# Patient Record
Sex: FEMALE | Race: WHITE | NOT HISPANIC OR LATINO | URBAN - METROPOLITAN AREA
[De-identification: names, ages, dates, MRNs, and addresses within clinical notes are randomized per-mention and may not be internally consistent; named-entity substitution may affect disease eponyms.]

---

## 2024-07-22 ENCOUNTER — EMERGENCY (EMERGENCY)
Facility: HOSPITAL | Age: 34
LOS: 1 days | Discharge: HOME | End: 2024-07-22
Attending: EMERGENCY MEDICINE
Payer: COMMERCIAL

## 2024-07-22 VITALS
HEART RATE: 81 BPM | OXYGEN SATURATION: 97 % | TEMPERATURE: 99 F | RESPIRATION RATE: 115 BRPM | SYSTOLIC BLOOD PRESSURE: 114 MMHG | WEIGHT: 145.06 LBS | DIASTOLIC BLOOD PRESSURE: 78 MMHG | HEIGHT: 62 IN

## 2024-07-22 VITALS
RESPIRATION RATE: 17 BRPM | HEART RATE: 87 BPM | DIASTOLIC BLOOD PRESSURE: 67 MMHG | SYSTOLIC BLOOD PRESSURE: 115 MMHG | OXYGEN SATURATION: 100 % | TEMPERATURE: 98 F

## 2024-07-22 LAB
ALBUMIN SERPL ELPH-MCNC: 4 G/DL — SIGNIFICANT CHANGE UP (ref 3.3–5)
ALP SERPL-CCNC: 64 U/L — SIGNIFICANT CHANGE UP (ref 40–120)
ALT FLD-CCNC: 22 U/L — SIGNIFICANT CHANGE UP (ref 10–45)
ANION GAP SERPL CALC-SCNC: 16 MMOL/L — SIGNIFICANT CHANGE UP (ref 5–17)
APPEARANCE UR: CLEAR — SIGNIFICANT CHANGE UP
APTT BLD: 25 SEC — SIGNIFICANT CHANGE UP (ref 24.5–35.6)
AST SERPL-CCNC: 19 U/L — SIGNIFICANT CHANGE UP (ref 10–40)
BACTERIA # UR AUTO: NEGATIVE /HPF — SIGNIFICANT CHANGE UP
BASE EXCESS BLDV CALC-SCNC: -1.1 MMOL/L — SIGNIFICANT CHANGE UP (ref -2–3)
BASOPHILS # BLD AUTO: 0.01 K/UL — SIGNIFICANT CHANGE UP (ref 0–0.2)
BASOPHILS NFR BLD AUTO: 0.1 % — SIGNIFICANT CHANGE UP (ref 0–2)
BILIRUB SERPL-MCNC: 0.2 MG/DL — SIGNIFICANT CHANGE UP (ref 0.2–1.2)
BILIRUB UR-MCNC: NEGATIVE — SIGNIFICANT CHANGE UP
BLD GP AB SCN SERPL QL: NEGATIVE — SIGNIFICANT CHANGE UP
BUN SERPL-MCNC: 9 MG/DL — SIGNIFICANT CHANGE UP (ref 7–23)
CA-I SERPL-SCNC: 1.26 MMOL/L — SIGNIFICANT CHANGE UP (ref 1.15–1.33)
CALCIUM SERPL-MCNC: 9.4 MG/DL — SIGNIFICANT CHANGE UP (ref 8.4–10.5)
CAST: 0 /LPF — SIGNIFICANT CHANGE UP (ref 0–4)
CHLORIDE BLDV-SCNC: 103 MMOL/L — SIGNIFICANT CHANGE UP (ref 96–108)
CHLORIDE SERPL-SCNC: 102 MMOL/L — SIGNIFICANT CHANGE UP (ref 96–108)
CO2 BLDV-SCNC: 26 MMOL/L — SIGNIFICANT CHANGE UP (ref 22–26)
CO2 SERPL-SCNC: 19 MMOL/L — LOW (ref 22–31)
COLOR SPEC: YELLOW — SIGNIFICANT CHANGE UP
CREAT SERPL-MCNC: 0.72 MG/DL — SIGNIFICANT CHANGE UP (ref 0.5–1.3)
DIFF PNL FLD: NEGATIVE — SIGNIFICANT CHANGE UP
EGFR: 114 ML/MIN/1.73M2 — SIGNIFICANT CHANGE UP
EOSINOPHIL # BLD AUTO: 0 K/UL — SIGNIFICANT CHANGE UP (ref 0–0.5)
EOSINOPHIL NFR BLD AUTO: 0 % — SIGNIFICANT CHANGE UP (ref 0–6)
GAS PNL BLDV: 133 MMOL/L — LOW (ref 136–145)
GAS PNL BLDV: SIGNIFICANT CHANGE UP
GAS PNL BLDV: SIGNIFICANT CHANGE UP
GLUCOSE BLDV-MCNC: 148 MG/DL — HIGH (ref 70–99)
GLUCOSE SERPL-MCNC: 143 MG/DL — HIGH (ref 70–99)
GLUCOSE UR QL: NEGATIVE MG/DL — SIGNIFICANT CHANGE UP
HCG SERPL-ACNC: <2 MIU/ML — SIGNIFICANT CHANGE UP
HCO3 BLDV-SCNC: 24 MMOL/L — SIGNIFICANT CHANGE UP (ref 22–29)
HCT VFR BLD CALC: 45.1 % — HIGH (ref 34.5–45)
HCT VFR BLDA CALC: 48 % — HIGH (ref 34.5–46.5)
HGB BLD CALC-MCNC: 15.9 G/DL — SIGNIFICANT CHANGE UP (ref 11.7–16.1)
HGB BLD-MCNC: 15.4 G/DL — SIGNIFICANT CHANGE UP (ref 11.5–15.5)
IMM GRANULOCYTES NFR BLD AUTO: 0.4 % — SIGNIFICANT CHANGE UP (ref 0–0.9)
INR BLD: 1.01 RATIO — SIGNIFICANT CHANGE UP (ref 0.85–1.18)
KETONES UR-MCNC: NEGATIVE MG/DL — SIGNIFICANT CHANGE UP
LACTATE BLDV-MCNC: 2.2 MMOL/L — HIGH (ref 0.5–2)
LEUKOCYTE ESTERASE UR-ACNC: NEGATIVE — SIGNIFICANT CHANGE UP
LIDOCAIN IGE QN: 14 U/L — SIGNIFICANT CHANGE UP (ref 7–60)
LYMPHOCYTES # BLD AUTO: 1.3 K/UL — SIGNIFICANT CHANGE UP (ref 1–3.3)
LYMPHOCYTES # BLD AUTO: 9.4 % — LOW (ref 13–44)
MCHC RBC-ENTMCNC: 29.5 PG — SIGNIFICANT CHANGE UP (ref 27–34)
MCHC RBC-ENTMCNC: 34.1 GM/DL — SIGNIFICANT CHANGE UP (ref 32–36)
MCV RBC AUTO: 86.4 FL — SIGNIFICANT CHANGE UP (ref 80–100)
MONOCYTES # BLD AUTO: 0.49 K/UL — SIGNIFICANT CHANGE UP (ref 0–0.9)
MONOCYTES NFR BLD AUTO: 3.5 % — SIGNIFICANT CHANGE UP (ref 2–14)
NEUTROPHILS # BLD AUTO: 12.03 K/UL — HIGH (ref 1.8–7.4)
NEUTROPHILS NFR BLD AUTO: 86.6 % — HIGH (ref 43–77)
NITRITE UR-MCNC: NEGATIVE — SIGNIFICANT CHANGE UP
NRBC # BLD: 0 /100 WBCS — SIGNIFICANT CHANGE UP (ref 0–0)
PCO2 BLDV: 42 MMHG — SIGNIFICANT CHANGE UP (ref 39–42)
PH BLDV: 7.37 — SIGNIFICANT CHANGE UP (ref 7.32–7.43)
PH UR: 6.5 — SIGNIFICANT CHANGE UP (ref 5–8)
PLATELET # BLD AUTO: 268 K/UL — SIGNIFICANT CHANGE UP (ref 150–400)
PO2 BLDV: 37 MMHG — SIGNIFICANT CHANGE UP (ref 25–45)
POTASSIUM BLDV-SCNC: 4.2 MMOL/L — SIGNIFICANT CHANGE UP (ref 3.5–5.1)
POTASSIUM SERPL-MCNC: 3.9 MMOL/L — SIGNIFICANT CHANGE UP (ref 3.5–5.3)
POTASSIUM SERPL-SCNC: 3.9 MMOL/L — SIGNIFICANT CHANGE UP (ref 3.5–5.3)
PROT SERPL-MCNC: 7.4 G/DL — SIGNIFICANT CHANGE UP (ref 6–8.3)
PROT UR-MCNC: NEGATIVE MG/DL — SIGNIFICANT CHANGE UP
PROTHROM AB SERPL-ACNC: 11.1 SEC — SIGNIFICANT CHANGE UP (ref 9.5–13)
RBC # BLD: 5.22 M/UL — HIGH (ref 3.8–5.2)
RBC # FLD: 11.9 % — SIGNIFICANT CHANGE UP (ref 10.3–14.5)
RBC CASTS # UR COMP ASSIST: 0 /HPF — SIGNIFICANT CHANGE UP (ref 0–4)
RH IG SCN BLD-IMP: POSITIVE — SIGNIFICANT CHANGE UP
SAO2 % BLDV: 65.2 % — LOW (ref 67–88)
SODIUM SERPL-SCNC: 137 MMOL/L — SIGNIFICANT CHANGE UP (ref 135–145)
SP GR SPEC: 1.01 — SIGNIFICANT CHANGE UP (ref 1–1.03)
SQUAMOUS # UR AUTO: 0 /HPF — SIGNIFICANT CHANGE UP (ref 0–5)
UROBILINOGEN FLD QL: 0.2 MG/DL — SIGNIFICANT CHANGE UP (ref 0.2–1)
WBC # BLD: 13.89 K/UL — HIGH (ref 3.8–10.5)
WBC # FLD AUTO: 13.89 K/UL — HIGH (ref 3.8–10.5)
WBC UR QL: 0 /HPF — SIGNIFICANT CHANGE UP (ref 0–5)

## 2024-07-22 PROCEDURE — 99284 EMERGENCY DEPT VISIT MOD MDM: CPT | Mod: 25

## 2024-07-22 PROCEDURE — 84295 ASSAY OF SERUM SODIUM: CPT

## 2024-07-22 PROCEDURE — 82330 ASSAY OF CALCIUM: CPT

## 2024-07-22 PROCEDURE — 99285 EMERGENCY DEPT VISIT HI MDM: CPT

## 2024-07-22 PROCEDURE — 86850 RBC ANTIBODY SCREEN: CPT

## 2024-07-22 PROCEDURE — 83690 ASSAY OF LIPASE: CPT

## 2024-07-22 PROCEDURE — 86900 BLOOD TYPING SEROLOGIC ABO: CPT

## 2024-07-22 PROCEDURE — 84702 CHORIONIC GONADOTROPIN TEST: CPT

## 2024-07-22 PROCEDURE — 82947 ASSAY GLUCOSE BLOOD QUANT: CPT

## 2024-07-22 PROCEDURE — 74177 CT ABD & PELVIS W/CONTRAST: CPT | Mod: MC

## 2024-07-22 PROCEDURE — 85018 HEMOGLOBIN: CPT

## 2024-07-22 PROCEDURE — 87086 URINE CULTURE/COLONY COUNT: CPT

## 2024-07-22 PROCEDURE — 74177 CT ABD & PELVIS W/CONTRAST: CPT | Mod: 26,MC

## 2024-07-22 PROCEDURE — 84132 ASSAY OF SERUM POTASSIUM: CPT

## 2024-07-22 PROCEDURE — 85025 COMPLETE CBC W/AUTO DIFF WBC: CPT

## 2024-07-22 PROCEDURE — 80053 COMPREHEN METABOLIC PANEL: CPT

## 2024-07-22 PROCEDURE — 86901 BLOOD TYPING SEROLOGIC RH(D): CPT

## 2024-07-22 PROCEDURE — 82803 BLOOD GASES ANY COMBINATION: CPT

## 2024-07-22 PROCEDURE — 85610 PROTHROMBIN TIME: CPT

## 2024-07-22 PROCEDURE — 96375 TX/PRO/DX INJ NEW DRUG ADDON: CPT

## 2024-07-22 PROCEDURE — 96374 THER/PROPH/DIAG INJ IV PUSH: CPT | Mod: XU

## 2024-07-22 PROCEDURE — 82435 ASSAY OF BLOOD CHLORIDE: CPT

## 2024-07-22 PROCEDURE — 83605 ASSAY OF LACTIC ACID: CPT

## 2024-07-22 PROCEDURE — 81001 URINALYSIS AUTO W/SCOPE: CPT

## 2024-07-22 PROCEDURE — 85730 THROMBOPLASTIN TIME PARTIAL: CPT

## 2024-07-22 PROCEDURE — 85014 HEMATOCRIT: CPT

## 2024-07-22 RX ORDER — SODIUM CHLORIDE 0.9 % (FLUSH) 0.9 %
2000 SYRINGE (ML) INJECTION ONCE
Refills: 0 | Status: COMPLETED | OUTPATIENT
Start: 2024-07-22 | End: 2024-07-22

## 2024-07-22 RX ORDER — FAMOTIDINE 40 MG
20 TABLET ORAL ONCE
Refills: 0 | Status: COMPLETED | OUTPATIENT
Start: 2024-07-22 | End: 2024-07-22

## 2024-07-22 RX ORDER — ACETAMINOPHEN 325 MG
1000 TABLET ORAL ONCE
Refills: 0 | Status: COMPLETED | OUTPATIENT
Start: 2024-07-22 | End: 2024-07-22

## 2024-07-22 RX ORDER — ONDANSETRON HYDROCHLORIDE 2 MG/ML
4 INJECTION INTRAMUSCULAR; INTRAVENOUS ONCE
Refills: 0 | Status: COMPLETED | OUTPATIENT
Start: 2024-07-22 | End: 2024-07-22

## 2024-07-22 RX ADMIN — Medication 20 MILLIGRAM(S): at 08:23

## 2024-07-22 RX ADMIN — ONDANSETRON HYDROCHLORIDE 4 MILLIGRAM(S): 2 INJECTION INTRAMUSCULAR; INTRAVENOUS at 08:23

## 2024-07-22 RX ADMIN — Medication 2000 MILLILITER(S): at 08:21

## 2024-07-22 RX ADMIN — Medication 400 MILLIGRAM(S): at 08:23

## 2024-07-22 NOTE — ED PROVIDER NOTE - CLINICAL SUMMARY MEDICAL DECISION MAKING FREE TEXT BOX
Diego 34-year-old female no past medical on OCP LMP every 3 months presents with acute onset nausea vomiting nonbilious and profuse diarrhea since 9 PM last night diarrhea is bloody no previous history of GI bleed no history of hemorrhoids patient endorses severe lower abdominal pain crampy in quality diagnosed with COVID on Tuesday with URI symptoms and fever which have resolved on exam respiratory rate normal sats 100% on room air lungs clear positive tenderness right and left lower quadrant no rebound no guarding no previous abdominal surgeries no CVA tenderness no hematuria or dysuria no previous history of ovarian cyst will reevaluate with chaperone for pelvic exam rectal exam high suspicion for enterocolitis secondary to viral etiology from COVID however with michael rectal bleeding will consider evaluation for ischemic colitis

## 2024-07-22 NOTE — ED ADULT NURSE NOTE - RESPIRATORY RATE (BREATHS/MIN)
Mother picked up: prescription.   Identity was verified: Yes.     Mom wants to get script at [place]   [phone]

## 2024-07-22 NOTE — ED PROVIDER NOTE - NSFOLLOWUPINSTRUCTIONS_ED_ALL_ED_FT
You are seen in the ED for diarrhea and abdominal pain.  Your evaluated lower been a CAT scan, the results are attached as documentation below.  No further acute intervention currently needed.  Please return to the ED for worsening symptoms, this includes worsening diarrhea, blood in your diarrhea, fevers, chills.  You should follow-up with a gastroenterologist as discussed, normally attached below, you will also receive a call from a discharge center to help set up an appointment.    Viral Gastroenteritis, Adult  Body outline showing the digestive tract, including the stomach, small intestine, and large intestine.  Viral gastroenteritis is also known as the stomach flu. This condition may affect your stomach, small intestine, and large intestine. It can cause sudden watery diarrhea, fever, and vomiting. This condition is caused by many different viruses. These viruses can be passed from person to person very easily (are contagious).    Diarrhea and vomiting can make you feel weak and cause you to become dehydrated. You may not be able to keep fluids down. Dehydration can make you tired and thirsty, cause you to have a dry mouth, and decrease how often you urinate. It is important to replace the fluids that you lose from diarrhea and vomiting.    What are the causes?  Gastroenteritis is caused by many viruses, including rotavirus and norovirus. Norovirus is the most common cause in adults. You can get sick after being exposed to the viruses from other people. You can also get sick by:  Eating food, drinking water, or touching a surface contaminated with one of these viruses.  Sharing utensils or other personal items with an infected person.  What increases the risk?  You are more likely to develop this condition if you:  Have a weak body defense system (immune system).  Live with one or more children who are younger than 2 years.  Live in a nursing home.  Travel on cruise ships.  What are the signs or symptoms?  Symptoms of this condition start suddenly 1–3 days after exposure to a virus. Symptoms may last for a few days or for as long as a week. Common symptoms include watery diarrhea and vomiting. Other symptoms include:  Fever.  Headache.  Fatigue.  Pain in the abdomen.  Chills.  Weakness.  Nausea.  Muscle aches.  Loss of appetite.  How is this diagnosed?  This condition is diagnosed with a medical history and physical exam. You may also have a stool test to check for viruses or other infections.    How is this treated?  This condition typically goes away on its own. The focus of treatment is to prevent dehydration and restore lost fluids (rehydration). This condition may be treated with:  An oral rehydration solution (ORS) to replace important salts and minerals (electrolytes) in your body. Take this if told by your health care provider. This is a drink that is sold at pharmacies and retail stores.  Medicines to help with your symptoms.  Probiotic supplements to reduce symptoms of diarrhea.  Fluids given through an IV, if dehydration is severe.  Older adults and people with other diseases or a weak immune system are at higher risk for dehydration.    Follow these instructions at home:  Eating and drinking    A comparison of three sample cups showing dark yellow, yellow, and pale yellow urine.  Take an ORS as told by your health care provider.  Drink clear fluids in small amounts as you are able. Clear fluids include:  Water.  Ice chips.  Diluted fruit juice.  Low-calorie sports drinks.  Drink enough fluid to keep your urine pale yellow.  Eat small amounts of healthy foods every 3–4 hours as you are able. This may include whole grains, fruits, vegetables, lean meats, and yogurt.  Avoid fluids that contain a lot of sugar or caffeine, such as energy drinks, sports drinks, and soda.  Avoid spicy or fatty foods.  Avoid alcohol.  General instructions    Washing hands with soap and water.  Wash your hands often, especially after having diarrhea or vomiting. If soap and water are not available, use hand .  Make sure that all people in your household wash their hands well and often.  Take over-the-counter and prescription medicines only as told by your health care provider.  Rest at home while you recover.  Watch your condition for any changes.  Take a warm bath to relieve any burning or pain from frequent diarrhea episodes.  Keep all follow-up visits. This is important.  Contact a health care provider if you:  Cannot keep fluids down.  Have symptoms that get worse.  Have new symptoms.  Feel light-headed or dizzy.  Have muscle cramps.  Get help right away if you:  Have chest pain.  Have trouble breathing or you are breathing very quickly.  Have a fast heartbeat.  Feel extremely weak or you faint.  Have a severe headache, a stiff neck, or both.  Have a rash.  Have severe pain, cramping, or bloating in your abdomen.  Have skin that feels cold and clammy.  Feel confused.  Have pain when you urinate.  Have signs of dehydration, such as:  Dark urine, very little urine, or no urine.  Cracked lips.  Dry mouth.  Sunken eyes.  Sleepiness.  Weakness.  Have signs of bleeding, such as:  Seeing blood in your vomit.  Having vomit that looks like coffee grounds.  Having bloody or black stools or stools that look like tar.  These symptoms may be an emergency. Get help right away. Call 911.  Do not wait to see if the symptoms will go away.  Do not drive yourself to the hospital.  Summary  Viral gastroenteritis is also known as the stomach flu. It can cause sudden watery diarrhea, fever, and vomiting.  This condition can be passed from person to person very easily (is contagious).  Take an oral rehydration solution (ORS) if told by your health care provider. This is a drink that is sold at pharmacies and retail stores.  Wash your hands often, especially after having diarrhea or vomiting. If soap and water are not available, use hand .  This information is not intended to replace advice given to you by your health care provider. Make sure you discuss any questions you have with your health care provider.

## 2024-07-22 NOTE — ED ADULT NURSE NOTE - OBJECTIVE STATEMENT
34 year old female coming into emergency department complaining of abdominal pain. A&Ox4. No significant past medical history. Patient states her pain is 7/10 and started last night. Patient states she had one episode of vomiting last night. Patient states she has nausea and diarrhea. Patient denies any chest pain and shortness of breath. Patient endorses some dizziness last night after the vomiting episode. Patients fiance at the bedside. Patient placed in gown, call bell in reach and positioned for comfort. Patient breathing spontaneous and unlabored. LMP june 5th, patient states she takes birth control and gets her period every 3 months.

## 2024-07-22 NOTE — ED PROVIDER NOTE - PROGRESS NOTE DETAILS
Diego shared decision making with patient patient and dave would like CAT scan in setting of COVID with blood reasonable will CTA to rule out ischemic colitis low suspicion for mesenteric ischemia patient reevaluated still with 6-8 out of 10 pain pelvic exam performed with nurse Bernadette chaperone in room no adnexal tenderness no CMT no discharge rectal exam Brown stool no hemorrhoids Alpa Bill, PGY1    Patient reassessed, no abdominal pain at this time, has not had a bloody bowel movement since being in the ED.  CAT scan showed presence of colitis, no other acute process.  Patient will follow-up with PCP for further management.  Stable for discharge, vital stable.  Patient aware of plan and agreeable.

## 2024-07-22 NOTE — ED PROVIDER NOTE - OBJECTIVE STATEMENT
34-year-old female past medical history presents ED complaining of severe suprapubic and lower abdominal pain beginning last night around 9 PM.  Patient recently diagnosed with COVID 2 days ago, generalized upper respiratory symptoms low-grade fever however yesterday noticed severe sudden onset lower abdominal pain with associated 4-5 episodes of nonbloody diarrhea with 1 episode of nonbilious nonbloody vomit.  Denies eating anything abnormal.  LMP Kayla 10, normal cycles every 3 months.  Last saw PCP and OB/GYN 1 month ago with unremarkable workup.  Denies chills chest pain, denies colonoscopy history, no prior abdominal surgeries.

## 2024-07-22 NOTE — ED PROVIDER NOTE - PHYSICAL EXAMINATION
Gen: AAOx3, non-toxic  Head: NCAT  HEENT: EOMI, oral mucosa moist, normal conjunctiva  Lung: CTAB, no respiratory distress, no wheezes/rhonchi/rales B/L,   CV: RRR, no murmurs, rubs or gallops  Abd: soft, mild RLQ/LLQ abd ttp, nondistended no guarding, no CVA tenderness  MSK: no visible deformities  Neuro: No focal sensory or motor deficits  Skin: Warm, well perfused, no rash  Psych: normal affect.

## 2024-07-22 NOTE — ED PROVIDER NOTE - PATIENT PORTAL LINK FT
You can access the FollowMyHealth Patient Portal offered by [facility] by registering at the following website: [email or web address]. By joining [employer]’s FollowMyHealth portal, you will also be able to view your health information using other applications (apps) compatible with our system.

## 2024-07-22 NOTE — ED PROVIDER NOTE - NSFOLLOWUPCLINICS_GEN_ALL_ED_FT
Gastroenterology at [facility]  Gastroenterology  [address] [place] [id number]  Phone: [phone]  Fax:

## 2024-07-23 LAB
CULTURE RESULTS: NO GROWTH — SIGNIFICANT CHANGE UP
SPECIMEN SOURCE: SIGNIFICANT CHANGE UP

## 2024-07-23 NOTE — ED POST DISCHARGE NOTE - DETAILS
7/23/24 - [facility] for 1522.  Teena 7/24/24 Diaz CR- spoke with patient, informed her of the incidental findings on imaging. pt had COVID-19 diagnosed tuesday July 16th per patient. Her covid URI symptoms had improved and she has a lingering cough that's milder and improved. pt instructed to follow up with PCP within 2-3 days and for repeat imaging in 1 month. pt conveys understanding. 7/24/24 Diaz CR- spoke with patient, informed her of the incidental findings on imaging. pt had COVID-19 diagnosed tuesday July 16th per patient. Her covid URI symptoms had improved and she has a lingering cough that's milder and improved. pt instructed to follow up with PCP within 2-3 days and for repeat imaging in 1 month. pt conveys understanding. strict return precautions given.